# Patient Record
Sex: FEMALE | Race: WHITE | Employment: UNEMPLOYED | URBAN - METROPOLITAN AREA
[De-identification: names, ages, dates, MRNs, and addresses within clinical notes are randomized per-mention and may not be internally consistent; named-entity substitution may affect disease eponyms.]

---

## 2021-06-02 ENCOUNTER — HOSPITAL ENCOUNTER (EMERGENCY)
Age: 31
Discharge: HOME | End: 2021-06-03
Attending: EMERGENCY MEDICINE

## 2021-06-02 ENCOUNTER — APPOINTMENT (OUTPATIENT)
Dept: GENERAL RADIOLOGY | Age: 31
End: 2021-06-02
Attending: EMERGENCY MEDICINE

## 2021-06-02 DIAGNOSIS — F41.1 ANXIETY STATE: Primary | ICD-10-CM

## 2021-06-02 DIAGNOSIS — F43.21 GRIEF REACTION: ICD-10-CM

## 2021-06-02 LAB
ALBUMIN SERPL-MCNC: 3.7 G/DL (ref 3.5–5)
ALBUMIN/GLOB SERPL: 1.1 {RATIO} (ref 1.1–2.2)
ALP SERPL-CCNC: 58 U/L (ref 45–117)
ALT SERPL-CCNC: 20 U/L (ref 12–78)
ANION GAP SERPL CALC-SCNC: 7 MMOL/L (ref 5–15)
AST SERPL-CCNC: 12 U/L (ref 15–37)
BASE DEFICIT BLD-SCNC: 3 MMOL/L
BASOPHILS # BLD: 0 K/UL (ref 0–0.1)
BASOPHILS NFR BLD: 0 % (ref 0–1)
BILIRUB SERPL-MCNC: 0.2 MG/DL (ref 0.2–1)
BUN SERPL-MCNC: 7 MG/DL (ref 6–20)
BUN/CREAT SERPL: 11 (ref 12–20)
CA-I BLD-MCNC: 1.13 MMOL/L (ref 1.12–1.32)
CALCIUM SERPL-MCNC: 8.6 MG/DL (ref 8.5–10.1)
CHLORIDE BLD-SCNC: 111 MMOL/L (ref 100–108)
CHLORIDE SERPL-SCNC: 110 MMOL/L (ref 97–108)
CO2 BLD-SCNC: 23 MMOL/L (ref 19–24)
CO2 SERPL-SCNC: 24 MMOL/L (ref 21–32)
COMMENT, HOLDF: NORMAL
COMMENT, HOLDF: NORMAL
CREAT SERPL-MCNC: 0.64 MG/DL (ref 0.55–1.02)
CREAT UR-MCNC: 0.7 MG/DL (ref 0.6–1.3)
DIFFERENTIAL METHOD BLD: NORMAL
EOSINOPHIL # BLD: 0.1 K/UL (ref 0–0.4)
EOSINOPHIL NFR BLD: 1 % (ref 0–7)
ERYTHROCYTE [DISTWIDTH] IN BLOOD BY AUTOMATED COUNT: 12.9 % (ref 11.5–14.5)
GLOBULIN SER CALC-MCNC: 3.3 G/DL (ref 2–4)
GLUCOSE BLD STRIP.AUTO-MCNC: 117 MG/DL (ref 74–106)
GLUCOSE SERPL-MCNC: 104 MG/DL (ref 65–100)
HCG SERPL QL: NEGATIVE
HCO3 BLDA-SCNC: 22 MMOL/L
HCT VFR BLD AUTO: 41.7 % (ref 35–47)
HGB BLD-MCNC: 13.5 G/DL (ref 11.5–16)
IMM GRANULOCYTES # BLD AUTO: 0 K/UL (ref 0–0.04)
IMM GRANULOCYTES NFR BLD AUTO: 0 % (ref 0–0.5)
LACTATE BLD-SCNC: 3.36 MMOL/L (ref 0.4–2)
LYMPHOCYTES # BLD: 2.7 K/UL (ref 0.8–3.5)
LYMPHOCYTES NFR BLD: 25 % (ref 12–49)
MCH RBC QN AUTO: 27.4 PG (ref 26–34)
MCHC RBC AUTO-ENTMCNC: 32.4 G/DL (ref 30–36.5)
MCV RBC AUTO: 84.6 FL (ref 80–99)
MONOCYTES # BLD: 0.7 K/UL (ref 0–1)
MONOCYTES NFR BLD: 6 % (ref 5–13)
NEUTS SEG # BLD: 7.4 K/UL (ref 1.8–8)
NEUTS SEG NFR BLD: 68 % (ref 32–75)
NRBC # BLD: 0 K/UL (ref 0–0.01)
NRBC BLD-RTO: 0 PER 100 WBC
PCO2 BLDV: 37.8 MMHG (ref 41–51)
PH BLDV: 7.37 [PH] (ref 7.32–7.42)
PLATELET # BLD AUTO: 315 K/UL (ref 150–400)
PMV BLD AUTO: 10.1 FL (ref 8.9–12.9)
PO2 BLDV: 33 MMHG (ref 25–40)
POTASSIUM BLD-SCNC: 3.6 MMOL/L (ref 3.5–5.5)
POTASSIUM SERPL-SCNC: 3.6 MMOL/L (ref 3.5–5.1)
PROT SERPL-MCNC: 7 G/DL (ref 6.4–8.2)
RBC # BLD AUTO: 4.93 M/UL (ref 3.8–5.2)
SAMPLES BEING HELD,HOLD: NORMAL
SAMPLES BEING HELD,HOLD: NORMAL
SERVICE CMNT-IMP: ABNORMAL
SODIUM BLD-SCNC: 144 MMOL/L (ref 136–145)
SODIUM SERPL-SCNC: 141 MMOL/L (ref 136–145)
SPECIMEN SITE: ABNORMAL
WBC # BLD AUTO: 10.9 K/UL (ref 3.6–11)

## 2021-06-02 PROCEDURE — 99285 EMERGENCY DEPT VISIT HI MDM: CPT

## 2021-06-02 PROCEDURE — 96374 THER/PROPH/DIAG INJ IV PUSH: CPT

## 2021-06-02 PROCEDURE — 93005 ELECTROCARDIOGRAM TRACING: CPT

## 2021-06-02 PROCEDURE — 84703 CHORIONIC GONADOTROPIN ASSAY: CPT

## 2021-06-02 PROCEDURE — 80053 COMPREHEN METABOLIC PANEL: CPT

## 2021-06-02 PROCEDURE — 85025 COMPLETE CBC W/AUTO DIFF WBC: CPT

## 2021-06-02 PROCEDURE — 71045 X-RAY EXAM CHEST 1 VIEW: CPT

## 2021-06-02 PROCEDURE — 74011250636 HC RX REV CODE- 250/636: Performed by: EMERGENCY MEDICINE

## 2021-06-02 PROCEDURE — 36415 COLL VENOUS BLD VENIPUNCTURE: CPT

## 2021-06-02 PROCEDURE — 84295 ASSAY OF SERUM SODIUM: CPT

## 2021-06-02 RX ORDER — LORAZEPAM 2 MG/ML
0.5 INJECTION INTRAMUSCULAR
Status: COMPLETED | OUTPATIENT
Start: 2021-06-02 | End: 2021-06-02

## 2021-06-02 RX ADMIN — LORAZEPAM 0.5 MG: 2 INJECTION INTRAMUSCULAR; INTRAVENOUS at 23:27

## 2021-06-03 VITALS
DIASTOLIC BLOOD PRESSURE: 70 MMHG | HEART RATE: 97 BPM | SYSTOLIC BLOOD PRESSURE: 115 MMHG | RESPIRATION RATE: 23 BRPM | OXYGEN SATURATION: 100 % | TEMPERATURE: 98.4 F

## 2021-06-03 LAB
ATRIAL RATE: 85 BPM
CALCULATED P AXIS, ECG09: 43 DEGREES
CALCULATED R AXIS, ECG10: 25 DEGREES
CALCULATED T AXIS, ECG11: 32 DEGREES
DIAGNOSIS, 93000: NORMAL
LACTATE BLD-SCNC: 2.08 MMOL/L (ref 0.4–2)
P-R INTERVAL, ECG05: 170 MS
Q-T INTERVAL, ECG07: 372 MS
QRS DURATION, ECG06: 72 MS
QTC CALCULATION (BEZET), ECG08: 442 MS
VENTRICULAR RATE, ECG03: 85 BPM

## 2021-06-03 PROCEDURE — 83605 ASSAY OF LACTIC ACID: CPT

## 2021-06-03 PROCEDURE — 74011250636 HC RX REV CODE- 250/636: Performed by: EMERGENCY MEDICINE

## 2021-06-03 RX ORDER — HYDROXYZINE 50 MG/1
50 TABLET, FILM COATED ORAL
Qty: 20 TABLET | Refills: 0 | Status: SHIPPED | OUTPATIENT
Start: 2021-06-03 | End: 2021-06-13

## 2021-06-03 RX ADMIN — SODIUM CHLORIDE 500 ML: 9 INJECTION, SOLUTION INTRAVENOUS at 00:36

## 2021-06-03 NOTE — ED NOTES
Jennifer Daly MD reviewed discharge instructions with the patient. The patient verbalized understanding. All questions and concerns were addressed. The patient declined a wheelchair and is discharged ambulatory in the care of family members with instructions and prescriptions in hand. Pt is alert and oriented x 4. Respirations are clear and unlabored.

## 2021-06-03 NOTE — ED PROVIDER NOTES
EMERGENCY DEPARTMENT HISTORY AND PHYSICAL EXAM     ------------------------------------------------------------------------------------------------------  Please note that this dictation was completed with [employer], the [employer] voice recognition software. Quite often unanticipated grammatical, syntax, homophones, and other interpretive errors are inadvertently transcribed by the computer software. Please disregard these errors. Please excuse any errors that have escaped final proofreading.  -----------------------------------------------------------------------------------------------------------------    Date: 6/2/2021  Patient Name: Robel Michaud    History of Presenting Illness     Chief Complaint   Patient presents with    Anxiety       History Provided By: Patient, EMS, Cousin,  (pt speaks Karthikeyan)    HPI: Robel Michaud is a 32 y.o. female, with significant pmhx of anxiety and panic attacks, who presents via EMS to the ED with c/o severe anxiety and panic attack that occurred this evening after finding out that her grandmother that lives in [place] had passed away. Patient reports that this has been ongoing for the last 10 years and has not been taking medication for the last 5 years. Patient reports feeling all over weak and tired. Reports having left anterior chest pain that is nonradiating. Also reports having episode of vomiting today. No diarrhea, fever. This that she finds it difficult to speak although notes no sore throat. Pt also specifically denies any recent fevers, chills, diarrhea, abd pain, changes in BM, urinary sxs, or headache. PCP: None    Social Hx: denies tobacco, denies EtOH, denies Illicit Drugs     There are no other complaints, changes, or physical findings at this time. Not on File          Past History     Past Medical History:  No past medical history on file. Past Surgical History:  No past surgical history on file.     Family History:  No family history on file.    Social History:  Social History     Tobacco Use    Smoking status: Not on file   Substance Use Topics    Alcohol use: Not on file    Drug use: Not on file       Allergies:  Not on File      Review of Systems   Review of Systems   Constitutional: Positive for fatigue. Negative for fever. Eyes: Negative. Respiratory: Negative. Negative for shortness of breath. Cardiovascular: Negative for chest pain. Gastrointestinal: Positive for nausea and vomiting. Negative for abdominal pain. Endocrine: Negative. Genitourinary: Negative. Negative for difficulty urinating, dysuria and hematuria. Musculoskeletal: Negative. Skin: Negative. Neurological: Positive for weakness (Generalized). Psychiatric/Behavioral: Negative for suicidal ideas. All other systems reviewed and are negative. Physical Exam   Physical Exam  Vitals and nursing note reviewed. Constitutional:       General: She is not in acute distress. Appearance: She is well-developed. She is not diaphoretic. HENT:      Head: Normocephalic and atraumatic. Nose: Nose normal.   Eyes:      General: No scleral icterus. Conjunctiva/sclera: Conjunctivae normal.   Neck:      Trachea: No tracheal deviation. Cardiovascular:      Rate and Rhythm: Normal rate and regular rhythm. Heart sounds: Normal heart sounds. No murmur heard. No friction rub. Pulmonary:      Effort: Pulmonary effort is normal. No respiratory distress. Breath sounds: Normal breath sounds. No stridor. No wheezing or rales. Abdominal:      General: Bowel sounds are normal. There is no distension. Palpations: Abdomen is soft. Tenderness: There is no abdominal tenderness. There is no rebound. Musculoskeletal:         General: No tenderness. Normal range of motion. Cervical back: Normal range of motion. Comments: Moving all extremities   Skin:     General: Skin is warm and dry. Findings: No rash.    Neurological: General: No focal deficit present. Mental Status: She is alert and oriented to person, place, and time. Cranial Nerves: No cranial nerve deficit. Sensory: No sensory deficit. Psychiatric:         Mood and Affect: Mood is anxious. Speech: Speech normal.         Behavior: Behavior normal.         Thought Content: Thought content normal.         Judgment: Judgment normal.           Diagnostic Study Results     Labs -     Recent Results (from the past 12 hour(s))   BLOOD GAS,CHEM8,LACTIC ACID POC    Collection Time: 06/02/21 10:55 PM   Result Value Ref Range    Calcium, ionized (POC) 1.13 1.12 - 1.32 mmol/L    BICARBONATE 22 mmol/L    Base deficit (POC) 3.0 mmol/L    Sample source VENOUS BLOOD      CO2, POC 23 19 - 24 MMOL/L    Sodium,  136 - 145 MMOL/L    Potassium, POC 3.6 3.5 - 5.5 MMOL/L    Chloride,  (H) 100 - 108 MMOL/L    Glucose,  (H) 74 - 106 MG/DL    Creatinine, POC 0.7 0.6 - 1.3 MG/DL    Lactic Acid (POC) 3.36 (HH) 0.40 - 2.00 mmol/L    Critical value read back DR VILCHIS     pH, venous (POC) 7.37 7.32 - 7.42      pCO2, venous (POC) 37.8 (L) 41 - 51 MMHG    pO2, venous (POC) 33 25 - 40 mmHg   CBC WITH AUTOMATED DIFF    Collection Time: 06/02/21 11:01 PM   Result Value Ref Range    WBC 10.9 3.6 - 11.0 K/uL    RBC 4.93 3.80 - 5.20 M/uL    HGB 13.5 11.5 - 16.0 g/dL    HCT 41.7 35.0 - 47.0 %    MCV 84.6 80.0 - 99.0 FL    MCH 27.4 26.0 - 34.0 PG    MCHC 32.4 30.0 - 36.5 g/dL    RDW 12.9 11.5 - 14.5 %    PLATELET 958 717 - 480 K/uL    MPV 10.1 8.9 - 12.9 FL    NRBC 0.0 0  WBC    ABSOLUTE NRBC 0.00 0.00 - 0.01 K/uL    NEUTROPHILS 68 32 - 75 %    LYMPHOCYTES 25 12 - 49 %    MONOCYTES 6 5 - 13 %    EOSINOPHILS 1 0 - 7 %    BASOPHILS 0 0 - 1 %    IMMATURE GRANULOCYTES 0 0.0 - 0.5 %    ABS. NEUTROPHILS 7.4 1.8 - 8.0 K/UL    ABS. LYMPHOCYTES 2.7 0.8 - 3.5 K/UL    ABS. MONOCYTES 0.7 0.0 - 1.0 K/UL    ABS. EOSINOPHILS 0.1 0.0 - 0.4 K/UL    ABS.  BASOPHILS 0.0 0.0 - 0.1 K/UL    ABS. IMM. GRANS. 0.0 0.00 - 0.04 K/UL    DF AUTOMATED     METABOLIC PANEL, COMPREHENSIVE    Collection Time: 06/02/21 11:01 PM   Result Value Ref Range    Sodium 141 136 - 145 mmol/L    Potassium 3.6 3.5 - 5.1 mmol/L    Chloride 110 (H) 97 - 108 mmol/L    CO2 24 21 - 32 mmol/L    Anion gap 7 5 - 15 mmol/L    Glucose 104 (H) 65 - 100 mg/dL    BUN 7 6 - 20 MG/DL    Creatinine 0.64 0.55 - 1.02 MG/DL    BUN/Creatinine ratio 11 (L) 12 - 20      GFR est AA >60 >60 ml/min/1.73m2    GFR est non-AA >60 >60 ml/min/1.73m2    Calcium 8.6 8.5 - 10.1 MG/DL    Bilirubin, total 0.2 0.2 - 1.0 MG/DL    ALT (SGPT) 20 12 - 78 U/L    AST (SGOT) 12 (L) 15 - 37 U/L    Alk. phosphatase 58 45 - 117 U/L    Protein, total 7.0 6.4 - 8.2 g/dL    Albumin 3.7 3.5 - 5.0 g/dL    Globulin 3.3 2.0 - 4.0 g/dL    A-G Ratio 1.1 1.1 - 2.2     HCG QL SERUM    Collection Time: 06/02/21 11:01 PM   Result Value Ref Range    HCG, Ql. Negative NEG     SAMPLES BEING HELD    Collection Time: 06/02/21 11:01 PM   Result Value Ref Range    SAMPLES BEING HELD RD     COMMENT        Add-on orders for these samples will be processed based on acceptable specimen integrity and analyte stability, which may vary by analyte. SAMPLES BEING HELD    Collection Time: 06/02/21 11:01 PM   Result Value Ref Range    SAMPLES BEING HELD NO SAMPLE RECEIVED      COMMENT        Add-on orders for these samples will be processed based on acceptable specimen integrity and analyte stability, which may vary by analyte.    EKG, 12 LEAD, INITIAL    Collection Time: 06/02/21 11:03 PM   Result Value Ref Range    Ventricular Rate 85 BPM    Atrial Rate 85 BPM    P-R Interval 170 ms    QRS Duration 72 ms    Q-T Interval 372 ms    QTC Calculation (Bezet) 442 ms    Calculated P Axis 43 degrees    Calculated R Axis 25 degrees    Calculated T Axis 32 degrees    Diagnosis       Normal sinus rhythm  Normal ECG  No previous ECGs available     POC LACTIC ACID    Collection Time: 06/03/21  1:00 AM Result Value Ref Range    Lactic Acid (POC) 2.08 (HH) 0.40 - 2.00 mmol/L       Radiologic Studies -   XR CHEST PORT   Final Result   No evidence of acute cardiopulmonary process. CT Results  (Last 48 hours)    None        CXR Results  (Last 48 hours)               06/03/21 0017  XR CHEST PORT Final result    Impression:  No evidence of acute cardiopulmonary process. Narrative:  INDICATION: Chest pain       FINDINGS: AP portable imaging of the chest performed at 12 at 2:00 AM   demonstrates a normal cardiomediastinal silhouette. The lungs are clear   bilaterally. No significant osseous abnormalities are seen. Medical Decision Making   I am the first provider for this patient. I reviewed the vital signs, available nursing notes, past medical history, past surgical history, family history and social history. Vital Signs-Reviewed the patient's vital signs. Patient Vitals for the past 12 hrs:   Temp Pulse Resp BP SpO2   06/03/21 0019  97 23 115/70 100 %   06/02/21 2330  92 20 110/68 100 %   06/02/21 2253 98.4 °F (36.9 °C) 90 17 (!) 105/59 100 %       Pulse Oximetry Analysis - 100% on RA Normal    Records Reviewed/Interpretted: Nursing Notes from triage and Old Medical Records,     Provider Notes (Medical Decision Making):     DDX:  Anxiety, uti, arrhythmia    Plan:  Ekg, labs, ua, uds    Impression:  Anxiety, grief reaction    ED Course:   Initial assessment performed. The patients presenting problems have been discussed, and they are in agreement with the care plan formulated and outlined with them. I have encouraged them to ask questions as they arise throughout their visit.     I reviewed our electronic medical record system for any past medical records that were available that may contribute to the patients current condition, the nursing notes and and vital signs from today's visit  Nursing notes will be reviewed as they become available in realtime while the pt has been in the ED. Kristyn Mendes MD    I personally reviewed/interpreted pt's imaging. Agree with official read by radiology as noted above. Kristyn Mendes MD      1:24 AM  Progress note:  Pt noted to be feeling better, ready for discharge. Discussed lab and imaging negative cardiac work-up findings with pt, specifically noting negative troponin. Pt will follow up with mental health provider/primary care as instructed. All questions have been answered, pt voiced understanding and agreement with plan. Specific return precautions provided in addition to instructions for pt to return to the ED immediately should sx worsen at any time. Kristyn Mendes MD             Critical Care Time:     none      Diagnosis     Clinical Impression:   1. Anxiety state    2. Grief reaction        PLAN:  1. Current Discharge Medication List      START taking these medications    Details   hydrOXYzine HCL (ATARAX) 50 mg tablet Take 1 Tablet by mouth every six (6) hours as needed for Anxiety for up to 10 days. Qty: 20 Tablet, Refills: 0  Start date: 6/3/2021, End date: 6/13/2021           2. Follow-up Information     Follow up With Specialties Details Why Contact Info    [facility] EMERGENCY DEPT Emergency Medicine  As needed [address] [place] [address]  [phone]        Return to ED if worse     Disposition:    1:25 AM   The patient's results have been reviewed with family and/or caregiver. They verbally convey their understanding and agreement of the patient's signs, symptoms, diagnosis, treatment and prognosis and additionally agree to follow up as recommended in the discharge instructions or to return to the Emergency Room should the patient's condition change prior to their follow-up appointment. The family and/or caregiver verbally agrees with the care-plan and all of their questions have been answered.  The discharge instructions have also been provided to the them with educational information regarding the patient's diagnosis as well a list of reasons why the patient would want to return to the ER prior to their follow-up appointment should their condition change.   Eric Garcias MD

## 2021-06-03 NOTE — DISCHARGE INSTRUCTIONS
[address]:  [facility] NEUROPSYCHIATRIC [facility]       [facility]  [address]      Anne[place]Marie 37       [phone]      Accepts Insured Patients Only:  [facility]  [address]       [phone]  Near the corner of [facility] and [address] [facility] [address] in the near [facility]. Accepts most insurance including Medicaid/Medicare. No psychiatry. On the [facility] bus line. [address] most major insurances. Psychiatry available. Some DBT groups. [address] [facility] [phone]  Conor Sellers, [place] ([address] [facility])  Warren Dodson LCSW ([address] [facility])  Aryan Beatty LCSW ([facility])    [address] [facility] ([address]  [place], [place] ([address] [facility])    [address] [facility], [address] [place])     [phone]   Mikey Boateng, [address] [facility] (Trauma)  Yarely Sarks ([address])    [facility])    [phone]   Mixture of psychologists and psychiatrists. They do not accept Medicaid or Medicare. The [facility]  [address] [place] [address]       [phone]   Mixture of clinical social workers and psychologists.     [address] [facility]        [phone]  [address] [facility], [address] [place] [address] Counseling and Treatment  (Clinicians: Иван Barger LCSW and Taiwo Macario MSW both specialize in Trauma)  [address], [address] [facility]        [phone]     Phillip Gorman 40 [address]  [place], [address]         [address], [address] [facility], [address]  [place], [facility]2 [facility]        [address], [address] [facility], [address] [facility], 5352 [facility]        [address] Counseling  [address]  [place], [address]        [address] [facility] Harry psychologists practice here)  NASIM Chopra [address], [address]  [place], [address] [facility]        [phone]    Sliding Scale/Financial Aid/Differing Payment Options  [facility] [facility]      [phone]   Variety of treatment options, including DBT. [address]       [phone]   Provides a variety of group and individual counseling options. Insurance, Medicaid, Medicare and sliding scale    Mark Anthony Jorge, [address]      [phone]    [address] [facility] Psychological Services and Development ([facility])  [address] [place], [address]    [phone]  Training clinic for [employer] in Psychology; [facility] also carry some cases as well. Director: Mason Hare, Ph.D., LCP, [facility] (* She specializes in Anxiety; Child & Adol. Mental health)      Medicaid/Medicare providers in the [facility] OF [address]       [phone]    [facility]atives         [address]       [phone]    [place]  12[address] Critical access hospital, [facility] [address]    [phone] ex. [facility], [address]      [phone]    [address] [place] [address]     [phone]  N.  Penn Laird, [address] [address], [address]    1850 [facility], [address] [place] [address] Integrative Counseling Main Office    [phone] [facility] [address]    Intensive Community Outreach Services (ICOS)  Call ahead for appointment time  [address] [facility]     [phone]    [address]      [facility] [address]    1 [facility]      [address] 19 [facility] Jasiel Eckert Lindsay Lal) [phone]      Services for patients without Medicaid, Medicare or Insurance  The [address]       [phone]   See handout in separate folder    [address] [employer] on-site, psychiatry, AA meetings, counseling and social workers  [place]town: Bambi Jose 17 [phone]    [address] [place]:  [facility] [facility] 129 [phone]      Medication Assisted Treatment (MAT) Programs  The purpose of Medication Assisted Treatment (MAT) programs is to provide quality treatment for individuals living with Substance Use Disorder. We understand that after patients undergo a detox, some may need MAT treatment services to provide additional assistance on their road to recovery. When it comes to medication assisted treatment, Suboxone, Buprenorphine, and other drugs can serve as a valuable resource during recovery. We know that making such a significant change is difficult for patients and want to do everything we can to make the addiction treatment process as smooth and comfortable as possible. There are several locations around [place]; many can offer same day or next day appointments. Please call      [facility] ([address])  [address] [place] [phone]  Standing [address] ED referral appointment 10:00-11:00 Tuesday - Friday    [facility])   [address] [address] [phone]  Standing [address] ED referral appointment 10:00-11:00 Monday    [facility])   [address] [facility]  [phone]  Call or email Kana Hale: [phone], [email or web address]  To schedule an appointment    [facility] Addiction Medicine  [address] [facility], [address] [facility], [address]  for Shayy Contreras 70..   [place], [place] 310 [facility]  [address] [facility] [address], [address]  [address] [place] [address] Sita Jackson, [address]  [address] ([employer])  [address] [facility], [address]mol [address] Final  [phone]    Recovery from Addiction is possible!

## 2022-01-27 ENCOUNTER — HOSPITAL ENCOUNTER (EMERGENCY)
Age: 32
Discharge: LEFT BEFORE DISCHARGE | End: 2022-01-28
Attending: EMERGENCY MEDICINE

## 2022-01-27 VITALS
RESPIRATION RATE: 18 BRPM | TEMPERATURE: 97.9 F | SYSTOLIC BLOOD PRESSURE: 105 MMHG | WEIGHT: 145.5 LBS | OXYGEN SATURATION: 100 % | DIASTOLIC BLOOD PRESSURE: 53 MMHG | HEART RATE: 73 BPM

## 2022-01-27 PROCEDURE — 75810000275 HC EMERGENCY DEPT VISIT NO LEVEL OF CARE

## 2022-01-27 RX ORDER — BUTALBITAL, ACETAMINOPHEN AND CAFFEINE 50; 325; 40 MG/1; MG/1; MG/1
2 TABLET ORAL
Status: DISCONTINUED | OUTPATIENT
Start: 2022-01-27 | End: 2022-01-28 | Stop reason: HOSPADM

## 2025-02-11 ENCOUNTER — HOSPITAL ENCOUNTER (EMERGENCY)
Facility: HOSPITAL | Age: 35
Discharge: HOME | End: 2025-02-11
Payer: MEDICAID

## 2025-02-11 ENCOUNTER — APPOINTMENT (OUTPATIENT)
Facility: HOSPITAL | Age: 35
End: 2025-02-11
Payer: MEDICAID

## 2025-02-11 VITALS
DIASTOLIC BLOOD PRESSURE: 55 MMHG | TEMPERATURE: 97.3 F | OXYGEN SATURATION: 99 % | RESPIRATION RATE: 14 BRPM | HEART RATE: 71 BPM | SYSTOLIC BLOOD PRESSURE: 113 MMHG

## 2025-02-11 DIAGNOSIS — N30.00 ACUTE CYSTITIS WITHOUT HEMATURIA: ICD-10-CM

## 2025-02-11 DIAGNOSIS — O36.80X0 PREGNANCY OF UNKNOWN ANATOMIC LOCATION: Primary | ICD-10-CM

## 2025-02-11 LAB
ALBUMIN SERPL-MCNC: 3.8 G/DL (ref 3.5–5)
ALBUMIN/GLOB SERPL: 1.1 (ref 1.1–2.2)
ALP SERPL-CCNC: 64 U/L (ref 45–117)
ALT SERPL-CCNC: 18 U/L (ref 12–78)
ANION GAP SERPL CALC-SCNC: 7 MMOL/L (ref 2–12)
APPEARANCE UR: CLEAR
AST SERPL-CCNC: 10 U/L (ref 15–37)
BACTERIA URNS QL MICRO: ABNORMAL /HPF
BASOPHILS # BLD: 0.06 K/UL (ref 0–0.1)
BASOPHILS NFR BLD: 0.8 % (ref 0–1)
BILIRUB SERPL-MCNC: 0.3 MG/DL (ref 0.2–1)
BILIRUB UR QL: NEGATIVE
BUN SERPL-MCNC: 10 MG/DL (ref 6–20)
BUN/CREAT SERPL: 15 (ref 12–20)
CALCIUM SERPL-MCNC: 9.5 MG/DL (ref 8.5–10.1)
CHLORIDE SERPL-SCNC: 110 MMOL/L (ref 97–108)
CO2 SERPL-SCNC: 22 MMOL/L (ref 21–32)
COLOR UR: ABNORMAL
CREAT SERPL-MCNC: 0.66 MG/DL (ref 0.55–1.02)
DIFFERENTIAL METHOD BLD: ABNORMAL
EOSINOPHIL # BLD: 0.13 K/UL (ref 0–0.4)
EOSINOPHIL NFR BLD: 1.7 % (ref 0–7)
EPITH CASTS URNS QL MICRO: ABNORMAL /LPF
ERYTHROCYTE [DISTWIDTH] IN BLOOD BY AUTOMATED COUNT: 13.2 % (ref 11.5–14.5)
GLOBULIN SER CALC-MCNC: 3.6 G/DL (ref 2–4)
GLUCOSE SERPL-MCNC: 90 MG/DL (ref 65–100)
GLUCOSE UR STRIP.AUTO-MCNC: NEGATIVE MG/DL
HCG SERPL-ACNC: 659 MIU/ML (ref 0–6)
HCT VFR BLD AUTO: 44.4 % (ref 35–47)
HGB BLD-MCNC: 14.6 G/DL (ref 11.5–16)
HGB UR QL STRIP: NEGATIVE
IMM GRANULOCYTES # BLD AUTO: 0.02 K/UL (ref 0–0.04)
IMM GRANULOCYTES NFR BLD AUTO: 0.3 % (ref 0–0.5)
KETONES UR QL STRIP.AUTO: NEGATIVE MG/DL
LEUKOCYTE ESTERASE UR QL STRIP.AUTO: NEGATIVE
LYMPHOCYTES # BLD: 1.96 K/UL (ref 0.8–3.5)
LYMPHOCYTES NFR BLD: 25.4 % (ref 12–49)
MCH RBC QN AUTO: 27 PG (ref 26–34)
MCHC RBC AUTO-ENTMCNC: 32.9 G/DL (ref 30–36.5)
MCV RBC AUTO: 82.2 FL (ref 80–99)
MONOCYTES # BLD: 0.47 K/UL (ref 0–1)
MONOCYTES NFR BLD: 6.1 % (ref 5–13)
NEUTS SEG # BLD: 5.07 K/UL (ref 1.8–8)
NEUTS SEG NFR BLD: 65.7 % (ref 32–75)
NITRITE UR QL STRIP.AUTO: POSITIVE
NRBC # BLD: 0 K/UL (ref 0–0.01)
NRBC BLD-RTO: 0 PER 100 WBC
PH UR STRIP: 5.5 (ref 5–8)
PLATELET # BLD AUTO: 313 K/UL (ref 150–400)
PMV BLD AUTO: 9.7 FL (ref 8.9–12.9)
POTASSIUM SERPL-SCNC: 4.2 MMOL/L (ref 3.5–5.1)
PROT SERPL-MCNC: 7.4 G/DL (ref 6.4–8.2)
PROT UR STRIP-MCNC: NEGATIVE MG/DL
RBC # BLD AUTO: 5.4 M/UL (ref 3.8–5.2)
RBC #/AREA URNS HPF: ABNORMAL /HPF (ref 0–5)
SODIUM SERPL-SCNC: 139 MMOL/L (ref 136–145)
SP GR UR REFRACTOMETRY: 1.01
URINE CULTURE IF INDICATED: ABNORMAL
UROBILINOGEN UR QL STRIP.AUTO: 0.2 EU/DL (ref 0.2–1)
WBC # BLD AUTO: 7.7 K/UL (ref 3.6–11)
WBC URNS QL MICRO: ABNORMAL /HPF (ref 0–4)

## 2025-02-11 PROCEDURE — 80053 COMPREHEN METABOLIC PANEL: CPT

## 2025-02-11 PROCEDURE — 76817 TRANSVAGINAL US OBSTETRIC: CPT

## 2025-02-11 PROCEDURE — 85025 COMPLETE CBC W/AUTO DIFF WBC: CPT

## 2025-02-11 PROCEDURE — 87491 CHLMYD TRACH DNA AMP PROBE: CPT

## 2025-02-11 PROCEDURE — 87591 N.GONORRHOEAE DNA AMP PROB: CPT

## 2025-02-11 PROCEDURE — 81001 URINALYSIS AUTO W/SCOPE: CPT

## 2025-02-11 PROCEDURE — 99284 EMERGENCY DEPT VISIT MOD MDM: CPT

## 2025-02-11 PROCEDURE — 36415 COLL VENOUS BLD VENIPUNCTURE: CPT

## 2025-02-11 PROCEDURE — 84702 CHORIONIC GONADOTROPIN TEST: CPT

## 2025-02-11 RX ORDER — CEPHALEXIN 500 MG/1
500 CAPSULE ORAL 2 TIMES DAILY
Qty: 10 CAPSULE | Refills: 0 | Status: SHIPPED | OUTPATIENT
Start: 2025-02-11 | End: 2025-02-16

## 2025-02-11 ASSESSMENT — PAIN SCALES - GENERAL
PAINLEVEL_OUTOF10: 2
PAINLEVEL_OUTOF10: 4

## 2025-02-11 ASSESSMENT — PAIN DESCRIPTION - DESCRIPTORS
DESCRIPTORS: CRAMPING
DESCRIPTORS: CRAMPING

## 2025-02-11 ASSESSMENT — PAIN DESCRIPTION - LOCATION
LOCATION: ABDOMEN
LOCATION: ABDOMEN

## 2025-02-11 ASSESSMENT — PAIN DESCRIPTION - ORIENTATION
ORIENTATION: LOWER
ORIENTATION: LEFT;UPPER

## 2025-02-11 ASSESSMENT — PAIN - FUNCTIONAL ASSESSMENT: PAIN_FUNCTIONAL_ASSESSMENT: ACTIVITIES ARE NOT PREVENTED

## 2025-02-11 NOTE — DISCHARGE INSTRUCTIONS
Thank You!        It was a pleasure taking care of you in our Emergency Department today. We know that when you come to our Emergency Department, you are entrusting us with your health, comfort, and safety. Our physicians and nurses honor that trust, and truly appreciate the opportunity to care for you and your loved ones.      We also value your feedback. If you receive a survey about your Emergency Department experience today, please fill it out.  We care about our patients' feedback, and we listen to what you have to say.  Thank you.    Jessee Huertas MD  ________________________________________________________________________  I have included a copy of your lab results and/or radiologic studies from today's visit so you can have them easily available at your follow-up visit. We hope you feel better and please do not hesitate to contact the ED if you have any questions at all!    Recent Results (from the past 12 hour(s))   CBC with Auto Differential    Collection Time: 02/11/25 12:32 PM   Result Value Ref Range    WBC 7.7 3.6 - 11.0 K/uL    RBC 5.40 (H) 3.80 - 5.20 M/uL    Hemoglobin 14.6 11.5 - 16.0 g/dL    Hematocrit 44.4 35.0 - 47.0 %    MCV 82.2 80.0 - 99.0 FL    MCH 27.0 26.0 - 34.0 PG    MCHC 32.9 30.0 - 36.5 g/dL    RDW 13.2 11.5 - 14.5 %    Platelets 313 150 - 400 K/uL    MPV 9.7 8.9 - 12.9 FL    Nucleated RBCs 0.0 0  WBC    nRBC 0.00 0.00 - 0.01 K/uL    Neutrophils % 65.7 32.0 - 75.0 %    Lymphocytes % 25.4 12.0 - 49.0 %    Monocytes % 6.1 5.0 - 13.0 %    Eosinophils % 1.7 0.0 - 7.0 %    Basophils % 0.8 0.0 - 1.0 %    Immature Granulocytes % 0.3 0.0 - 0.5 %    Neutrophils Absolute 5.07 1.80 - 8.00 K/UL    Lymphocytes Absolute 1.96 0.80 - 3.50 K/UL    Monocytes Absolute 0.47 0.00 - 1.00 K/UL    Eosinophils Absolute 0.13 0.00 - 0.40 K/UL    Basophils Absolute 0.06 0.00 - 0.10 K/UL    Immature Granulocytes Absolute 0.02 0.00 - 0.04 K/UL    Differential Type AUTOMATED     Comprehensive Metabolic

## 2025-02-11 NOTE — ED PROVIDER NOTES
severe. Differential diagnoses include urinary tract infection, pelvic inflammatory disease, ovarian cyst, or early appendicitis. Pregnancy status is unknown and needs to be determined. Patient also reports mild burning sensation with urination for approximately 2 days, coinciding with the onset of abdominal pain. This symptom suggests a possible urinary tract infection, though the mild nature of the symptom and its timing with the abdominal pain onset could indicate a related or separate issue.  - Obtain urine pregnancy test  - If pregnancy test negative: order CT scan of abdomen/pelvis  - If pregnancy test positive: order pelvic ultrasound  - Conduct laboratory tests (unspecified, likely to include urinalysis and blood tests)  - Perform urinalysis (likely included in the unspecified laboratory tests mentioned)    ADDITIONAL CONSIDERATIONS:  None  Procedures/Critical Care     Procedures    ED FINAL IMPRESSION     1. Pregnancy of unknown anatomic location    2. Acute cystitis without hematuria        DISPOSITION/PLAN     DISPOSITION Decision To Discharge 02/11/2025 03:51:22 PM   DISPOSITION CONDITION Stable        Discharge Note: The patient is stable for discharge home. The signs, symptoms, diagnosis, and discharge instructions have been discussed, understanding conveyed, and agreed upon. The patient is to follow up as recommended or return to ER should their symptoms worsen.     PATIENT REFERRED TO:    [facility]  [address] [place] [address] [phone]        [facility] Emergency Department  [address] [facility] [address] [phone]    If symptoms worsen      DISCHARGE MEDICATIONS:       Medication List        START taking these medications      cephALEXin 500 MG capsule  Commonly known as: Keflex  Take 1 capsule by mouth 2 times daily for 5 days               Where to Get Your Medications        These medications were sent to [facility]/pharmacy #[id number] - [place], [place]

## 2025-02-12 LAB
C TRACH DNA SPEC QL NAA+PROBE: NEGATIVE
N GONORRHOEA DNA SPEC QL NAA+PROBE: NEGATIVE
SAMPLE TYPE: NORMAL
SERVICE CMNT-IMP: NORMAL
SPECIMEN SOURCE: NORMAL